# Patient Record
Sex: MALE | Race: WHITE | URBAN - METROPOLITAN AREA
[De-identification: names, ages, dates, MRNs, and addresses within clinical notes are randomized per-mention and may not be internally consistent; named-entity substitution may affect disease eponyms.]

---

## 2017-01-16 ENCOUNTER — TELEPHONE (OUTPATIENT)
Dept: INTERNAL MEDICINE | Age: 37
End: 2017-01-16

## 2017-08-09 ENCOUNTER — OFFICE VISIT (OUTPATIENT)
Dept: INTERNAL MEDICINE | Age: 37
End: 2017-08-09

## 2017-08-09 VITALS
HEIGHT: 71 IN | SYSTOLIC BLOOD PRESSURE: 100 MMHG | BODY MASS INDEX: 21.7 KG/M2 | WEIGHT: 155 LBS | HEART RATE: 70 BPM | RESPIRATION RATE: 12 BRPM | DIASTOLIC BLOOD PRESSURE: 60 MMHG

## 2017-08-09 DIAGNOSIS — Z00.00 ROUTINE GENERAL MEDICAL EXAMINATION AT A HEALTH CARE FACILITY: Primary | ICD-10-CM

## 2017-08-09 LAB
BILIRUBIN, POC: NORMAL
BLOOD URINE, POC: NORMAL
CLARITY, POC: CLEAR
COLOR, POC: YELLOW
GLUCOSE URINE, POC: NORMAL
KETONES, POC: NORMAL
LEUKOCYTE EST, POC: NORMAL
NITRITE, POC: NORMAL
PH, POC: 6
PROTEIN, POC: NORMAL
SPECIFIC GRAVITY, POC: 1.01
UROBILINOGEN, POC: NORMAL

## 2017-08-09 PROCEDURE — 99395 PREV VISIT EST AGE 18-39: CPT | Performed by: INTERNAL MEDICINE

## 2017-08-09 PROCEDURE — 81002 URINALYSIS NONAUTO W/O SCOPE: CPT | Performed by: INTERNAL MEDICINE

## 2017-08-09 ASSESSMENT — PATIENT HEALTH QUESTIONNAIRE - PHQ9
2. FEELING DOWN, DEPRESSED OR HOPELESS: 0
SUM OF ALL RESPONSES TO PHQ9 QUESTIONS 1 & 2: 0
1. LITTLE INTEREST OR PLEASURE IN DOING THINGS: 0
SUM OF ALL RESPONSES TO PHQ QUESTIONS 1-9: 0

## 2017-08-18 LAB
ALBUMIN SERPL-MCNC: 4.7 G/DL (ref 3.5–5.7)
ALP BLD-CCNC: 51 U/L (ref 36–125)
ALT SERPL-CCNC: 12 U/L (ref 7–52)
ANION GAP SERPL CALCULATED.3IONS-SCNC: 6 MMOL/L (ref 3–16)
AST SERPL-CCNC: 14 U/L (ref 13–39)
BASOPHILS ABSOLUTE: 31 /UL (ref 0–200)
BASOPHILS RELATIVE PERCENT: 0.7 % (ref 0–1)
BILIRUB SERPL-MCNC: 0.9 MG/DL (ref 0–1.5)
BUN BLDV-MCNC: 23 MG/DL (ref 7–25)
CALCIUM SERPL-MCNC: 9.7 MG/DL (ref 8.6–10.3)
CHLORIDE BLD-SCNC: 104 MMOL/L (ref 98–110)
CO2: 30 MMOL/L (ref 21–33)
CREAT SERPL-MCNC: 1.02 MG/DL (ref 0.6–1.3)
EOSINOPHILS ABSOLUTE: 136 /UL (ref 15–500)
EOSINOPHILS RELATIVE PERCENT: 3.1 % (ref 0–8)
GFR, ESTIMATED: >90 SEE NOTE.
GFR, ESTIMATED: >90 SEE NOTE.
GLUCOSE BLD-MCNC: 102 MG/DL (ref 70–100)
HCT VFR BLD CALC: 43.1 % (ref 38.5–50)
HEMOGLOBIN: 14.7 G/DL (ref 13.2–17.1)
LYMPHOCYTES ABSOLUTE: 1364 /UL (ref 850–3900)
LYMPHOCYTES RELATIVE PERCENT: 31 % (ref 15–45)
MCH RBC QN AUTO: 31.2 PG (ref 27–33)
MCHC RBC AUTO-ENTMCNC: 34.2 G/DL (ref 32–36)
MCV RBC AUTO: 91.3 FL (ref 80–100)
MONOCYTES ABSOLUTE: 378 /UL (ref 200–950)
MONOCYTES RELATIVE PERCENT: 8.6 % (ref 0–12)
NEUTROPHILS ABSOLUTE: 2490 /UL (ref 1500–7800)
NUCLEATED RED BLOOD CELLS: 0 /100 WBC (ref 0–0)
OSMOLALITY CALCULATION: 294 MOSM/KG (ref 278–305)
PDW BLD-RTO: 12.4 % (ref 11–15)
PLATELET # BLD: 154 10E3/UL (ref 140–400)
PMV BLD AUTO: 9.6 FL (ref 7.5–11.5)
POTASSIUM SERPL-SCNC: 4.6 MMOL/L (ref 3.5–5.3)
RBC # BLD: 4.72 10E6/UL (ref 4.2–5.8)
SEGMENTED NEUTROPHILS RELATIVE PERCENT: 56.6 % (ref 40–80)
SODIUM BLD-SCNC: 140 MMOL/L (ref 133–146)
TOTAL PROTEIN: 6.7 G/DL (ref 6.4–8.9)
TSH, 3RD GENERATION: 0.68 UIU/ML (ref 0.34–5.6)
WBC # BLD: 4.4 10E3/UL (ref 3.8–10.8)

## 2018-06-25 ENCOUNTER — TELEPHONE (OUTPATIENT)
Dept: INTERNAL MEDICINE | Age: 38
End: 2018-06-25

## 2018-06-26 ENCOUNTER — HOSPITAL ENCOUNTER (OUTPATIENT)
Dept: OTHER | Age: 38
Discharge: HOME | End: 2018-06-26
Attending: INTERNAL MEDICINE | Admitting: INTERNAL MEDICINE

## 2018-06-26 ENCOUNTER — OFFICE VISIT (OUTPATIENT)
Dept: INTERNAL MEDICINE | Age: 38
End: 2018-06-26

## 2018-06-26 VITALS
WEIGHT: 153 LBS | HEART RATE: 60 BPM | BODY MASS INDEX: 21.42 KG/M2 | SYSTOLIC BLOOD PRESSURE: 100 MMHG | RESPIRATION RATE: 12 BRPM | HEIGHT: 71 IN | DIASTOLIC BLOOD PRESSURE: 60 MMHG

## 2018-06-26 DIAGNOSIS — R06.02 SOB (SHORTNESS OF BREATH): ICD-10-CM

## 2018-06-26 DIAGNOSIS — R06.02 SOB (SHORTNESS OF BREATH): Primary | ICD-10-CM

## 2018-06-26 DIAGNOSIS — R07.9 CHEST PAIN, UNSPECIFIED TYPE: ICD-10-CM

## 2018-06-26 PROBLEM — J93.9 PNEUMOTHORAX: Status: ACTIVE | Noted: 2018-06-26

## 2018-06-26 PROCEDURE — 93000 ELECTROCARDIOGRAM COMPLETE: CPT | Performed by: INTERNAL MEDICINE

## 2018-06-26 PROCEDURE — 99213 OFFICE O/P EST LOW 20 MIN: CPT | Performed by: INTERNAL MEDICINE

## 2018-07-03 ENCOUNTER — HOSPITAL ENCOUNTER (OUTPATIENT)
Dept: OTHER | Age: 38
Discharge: HOME | End: 2018-07-03
Attending: INTERNAL MEDICINE | Admitting: INTERNAL MEDICINE

## 2018-07-03 ENCOUNTER — OFFICE VISIT (OUTPATIENT)
Dept: INTERNAL MEDICINE | Age: 38
End: 2018-07-03

## 2018-07-03 VITALS
WEIGHT: 150 LBS | HEART RATE: 66 BPM | HEIGHT: 72 IN | RESPIRATION RATE: 12 BRPM | DIASTOLIC BLOOD PRESSURE: 76 MMHG | BODY MASS INDEX: 20.32 KG/M2 | SYSTOLIC BLOOD PRESSURE: 122 MMHG

## 2018-07-03 DIAGNOSIS — J93.11 PRIMARY SPONTANEOUS PNEUMOTHORAX: Primary | ICD-10-CM

## 2018-07-03 DIAGNOSIS — J93.11 PRIMARY SPONTANEOUS PNEUMOTHORAX: ICD-10-CM

## 2018-07-03 PROCEDURE — 99213 OFFICE O/P EST LOW 20 MIN: CPT | Performed by: INTERNAL MEDICINE

## 2018-07-03 NOTE — PROGRESS NOTES
Chief Complaint   Patient presents with    Chest Injury        HPI:  Comes in for follow up of a spontaneous pneumothorax treated with a chest tube. He has no chest pain or shortness of breath. He has an appointment with Dr. Vigil Shown the end of next week. Social History     Social History    Marital status:      Spouse name: N/A    Number of children: N/A    Years of education: N/A     Occupational History    teacher      Social History Main Topics    Smoking status: Never Smoker    Smokeless tobacco: Never Used    Alcohol use Yes      Comment: occasional    Drug use: Unknown    Sexual activity: Not on file     Other Topics Concern    Not on file     Social History Narrative    Living Will:  No.             Patient Active Problem List   Diagnosis    Pneumothorax    Spontaneous pneumothorax     Past Medical History:   Diagnosis Date    Spontaneous pneumothorax *June, 2018    Dr. Musa Rothman     History reviewed. No pertinent surgical history. No current outpatient prescriptions on file. No current facility-administered medications for this visit.       No Known Allergies    Physical Exam:   /76 (Site: Left Arm, Position: Sitting, Cuff Size: Medium Adult)   Pulse 66   Resp 12   Ht 6' (1.829 m)   Wt 150 lb (68 kg)   BMI 20.34 kg/m²   General appearance: alert, appears stated age and cooperative  Lungs: clear to auscultation bilaterally  Heart: regular rate and rhythm, S1, S2 normal, no murmur, click, rub or gallop  Extremities: extremities normal, atraumatic, no cyanosis or edema  Other: N/A    Lab Review: not applicable      Assessment: Pneumothorax - resolved    Plan:               Chest xrsouth Alcantar MD

## 2018-07-13 ENCOUNTER — OFFICE VISIT (OUTPATIENT)
Dept: CARDIOTHORACIC SURGERY | Age: 38
End: 2018-07-13

## 2018-07-13 VITALS
HEIGHT: 72 IN | DIASTOLIC BLOOD PRESSURE: 72 MMHG | TEMPERATURE: 98.5 F | BODY MASS INDEX: 20.94 KG/M2 | OXYGEN SATURATION: 99 % | SYSTOLIC BLOOD PRESSURE: 110 MMHG | HEART RATE: 47 BPM | WEIGHT: 154.6 LBS

## 2018-07-13 DIAGNOSIS — S27.0XXD TRAUMATIC PNEUMOTHORAX, SUBSEQUENT ENCOUNTER: Primary | ICD-10-CM

## 2018-07-13 PROCEDURE — 99213 OFFICE O/P EST LOW 20 MIN: CPT | Performed by: THORACIC SURGERY (CARDIOTHORACIC VASCULAR SURGERY)

## 2018-07-13 NOTE — LETTER
07/13/18        [facility]  [address] [address]        RE:    Shirin Thomason,   1980    Dear Dr. Radha Page,    It was my pleasure to see your patient, Shirin Thomason, in the office today in follow up of spontaneous ptx requiring tube thoracosotomy 6/26/18 at [facility].    I have attached a copy of the office evaluation. Thank you for allowing me to participate in the care of this patient.       Sincerely,     Racheal Barnes MD    CC: Orval Patient, APRN - CNP  [id number] [facility] [address]  [address] [place] [phone]  VIA In [facility]

## 2018-07-13 NOTE — PROGRESS NOTES
Progress Note    S/P L CT placement for spontaneous ptx 6/26/18. CT site is healing without redness or purulence. Denies any shortness of breath or pain. Had f/u CXR 7/3/18. Vital Signs:                                                 /72 (Site: Left Arm, Position: Sitting)   Pulse (!) 47   Temp 98.5 °F (36.9 °C) (Oral)   Ht 6' (1.829 m)   Wt 154 lb 9.6 oz (70.1 kg)   SpO2 99%   BMI 20.97 kg/m²      Preop weight: 153 lb    CV: reg  Pulm: clear, L insertion site c/d/i      Medications:  Prior to Admission medications    Not on File        Data Review:  CBC:   Lab Results   Component Value Date    WBC 4.4 08/18/2017    HGB 14.7 08/18/2017    HCT 43.1 08/18/2017    MCV 91.3 08/18/2017     08/18/2017     BMP:   Lab Results   Component Value Date     08/18/2017    K 4.6 08/18/2017     08/18/2017    CO2 30 08/18/2017    BUN 23 08/18/2017    CREATININE 1.02 08/18/2017    CALCIUM 9.7 08/18/2017     PT/INR: No results found for: PROTIME, INR      Assessment/Plan:  discussed spont ptx. Tall aesthenic build. Risk of recurrence with potential need for surgical intervention - of note pt had similar sx 5 years ago, no imaging. 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 Has never smoked.   Follow up as needed    Jared Suresh MD  7/13/2018  9:25 AM

## 2019-04-29 PROBLEM — J93.9 PNEUMOTHORAX: Status: RESOLVED | Noted: 2018-06-26 | Resolved: 2019-04-29

## 2019-05-09 ENCOUNTER — OFFICE VISIT (OUTPATIENT)
Dept: INTERNAL MEDICINE CLINIC | Age: 39
End: 2019-05-09
Payer: COMMERCIAL

## 2019-05-09 VITALS
DIASTOLIC BLOOD PRESSURE: 70 MMHG | RESPIRATION RATE: 12 BRPM | WEIGHT: 157 LBS | SYSTOLIC BLOOD PRESSURE: 110 MMHG | HEART RATE: 66 BPM | BODY MASS INDEX: 21.26 KG/M2 | HEIGHT: 72 IN

## 2019-05-09 DIAGNOSIS — Z12.11 SPECIAL SCREENING FOR MALIGNANT NEOPLASMS, COLON: ICD-10-CM

## 2019-05-09 DIAGNOSIS — Z00.00 ROUTINE GENERAL MEDICAL EXAMINATION AT A HEALTH CARE FACILITY: Primary | ICD-10-CM

## 2019-05-09 LAB
BILIRUBIN, POC: NORMAL
BLOOD URINE, POC: NORMAL
CLARITY, POC: CLEAR
COLOR, POC: YELLOW
GLUCOSE URINE, POC: NORMAL
KETONES, POC: NORMAL
LEUKOCYTE EST, POC: NORMAL
NITRITE, POC: NORMAL
PH, POC: 5
PROTEIN, POC: NORMAL
SPECIFIC GRAVITY, POC: 1
UROBILINOGEN, POC: NORMAL

## 2019-05-09 PROCEDURE — 81002 URINALYSIS NONAUTO W/O SCOPE: CPT | Performed by: INTERNAL MEDICINE

## 2019-05-09 PROCEDURE — 99395 PREV VISIT EST AGE 18-39: CPT | Performed by: INTERNAL MEDICINE

## 2019-05-09 ASSESSMENT — PATIENT HEALTH QUESTIONNAIRE - PHQ9
SUM OF ALL RESPONSES TO PHQ QUESTIONS 1-9: 0
SUM OF ALL RESPONSES TO PHQ QUESTIONS 1-9: 0
2. FEELING DOWN, DEPRESSED OR HOPELESS: 0
1. LITTLE INTEREST OR PLEASURE IN DOING THINGS: 0
SUM OF ALL RESPONSES TO PHQ9 QUESTIONS 1 & 2: 0

## 2019-05-09 NOTE — PROGRESS NOTES
Willa Winters 41 y.o. presents today with   Chief Complaint   Patient presents with    Annual Exam       Family History   Problem Relation Age of Onset    Other Father 62        Alive, in good health.  Other Mother 62        Alive, in good health.        Social History     Socioeconomic History    Marital status:      Spouse name: Not on file    Number of children: Not on file    Years of education: Not on file    Highest education level: Not on file   Occupational History    Occupation: teacher   Social Needs    Financial resource strain: Not on file    Food insecurity:     Worry: Not on file     Inability: Not on file    Transportation needs:     Medical: Not on file     Non-medical: Not on file   Tobacco Use    Smoking status: Never Smoker    Smokeless tobacco: Never Used   Substance and Sexual Activity    Alcohol use: Yes     Comment: occasional    Drug use: Not on file    Sexual activity: Not on file   Lifestyle    Physical activity:     Days per week: Not on file     Minutes per session: Not on file    Stress: Not on file   Relationships    Social connections:     Talks on phone: Not on file     Gets together: Not on file     Attends Sikh service: Not on file     Active member of club or organization: Not on file     Attends meetings of clubs or organizations: Not on file     Relationship status: Not on file    Intimate partner violence:     Fear of current or ex partner: Not on file     Emotionally abused: Not on file     Physically abused: Not on file     Forced sexual activity: Not on file   Other Topics Concern    Not on file   Social History Narrative    Living Will:  No.               Patient Active Problem List   Diagnosis   (none) - all problems resolved or deleted       Past Medical History:   Diagnosis Date    Spontaneous pneumothorax *June, 2018    Dr. Jacques Ivan        Past Surgical History:   Procedure Laterality Date    CHEST TUBE INSERTION Left 06/26/2018 for spontaneous pneumothorax       No current outpatient medications on file. No current facility-administered medications for this visit. No Known Allergies    Review of Systems:     Immunization History   Administered Date(s) Administered    Tdap (Boostrix, Adacel) 07/07/2014     Eye Exam: 2017 by Dr. Gabriel Malone   Chest: Denies: cough, hemoptysis, shortness of breath, pleuritic chest pain, wheezing  Cardiovascular: Denies: chest pain, dyspnea on exertion, orthopnea, paroxysmal nocturnal dyspnea, edema, palpitations  Abdomen: no abdominal pain, change in bowel habits, or black or bloody stools  Colonoscopy: N/A  Fall Risk low  ADL   Without assistance  Other: N/A    Physical Exam:  General appearance: alert, appears stated age and cooperative  /70 (Site: Left Upper Arm, Position: Sitting, Cuff Size: Medium Adult)   Pulse 66   Resp 12   Ht 6' (1.829 m)   Wt 157 lb (71.2 kg)   BMI 21.29 kg/m²   Eyes: conjunctivae/corneas clear. PERRL, EOM's intact. Fundi benign. Ears: normal TM's and external ear canals both ears  Nose: Nares normal. Septum midline. Mucosa normal. No drainage or sinus tenderness. Throat: no abnormalities  Neck: no adenopathy, no carotid bruit, no JVD, supple, symmetrical, trachea midline and thyroid not enlarged, symmetric, no tenderness/mass/nodules  Nodes:no adenopathy palpable  Lungs: clear to auscultation bilaterally  Heart: regular rate and rhythm, S1, S2 normal, no murmur, click, rub or gallop  Abdomen: soft, non-tender; bowel sounds normal; no masses,  no organomegaly  Testicular Mass: No  Extremities: extremities normal, atraumatic, no cyanosis or edema  Neurological: Gait normal. Reflexes normal and symmetric.  Sensation grossly normal  Pulse: radial=4/4, femoral=4/4, popliteal=4/4, dorsalis pedis=4/4,   Rectal:N/A  Prostate:Deferred       Skin: normal coloration and turgor, no rashes, no suspicious skin lesions noted  Psych: normal    Lab Review:not applicable    Assessment: Stable    Plan:              Fasting labs, UA, and hemoccults     HIREN Jiménez MD

## 2019-05-15 DIAGNOSIS — Z00.00 ROUTINE GENERAL MEDICAL EXAMINATION AT A HEALTH CARE FACILITY: ICD-10-CM

## 2019-05-15 DIAGNOSIS — Z12.11 SPECIAL SCREENING FOR MALIGNANT NEOPLASMS, COLON: ICD-10-CM

## 2019-05-15 LAB
CONTROL: NORMAL
HEMOCCULT STL QL: NORMAL

## 2019-05-15 PROCEDURE — 82274 ASSAY TEST FOR BLOOD FECAL: CPT | Performed by: INTERNAL MEDICINE

## 2019-06-10 LAB
ALBUMIN SERPL-MCNC: 4.3 G/DL (ref 3.5–5.7)
ALP BLD-CCNC: 53 U/L (ref 36–125)
ALT SERPL-CCNC: 10 U/L (ref 7–52)
ANION GAP SERPL CALCULATED.3IONS-SCNC: 7 MMOL/L (ref 3–16)
AST SERPL-CCNC: 14 U/L (ref 13–39)
BILIRUB SERPL-MCNC: 0.7 MG/DL (ref 0–1.5)
BUN BLDV-MCNC: 19 MG/DL (ref 7–25)
CALCIUM SERPL-MCNC: 9.4 MG/DL (ref 8.6–10.3)
CHLORIDE BLD-SCNC: 104 MMOL/L (ref 98–110)
CHOLESTEROL, TOTAL: 158 MG/DL (ref 0–200)
CO2: 30 MMOL/L (ref 21–33)
CREAT SERPL-MCNC: 0.85 MG/DL (ref 0.6–1.3)
GFR, ESTIMATED: >90 SEE NOTE.
GFR, ESTIMATED: >90 SEE NOTE.
GLUCOSE BLD-MCNC: 96 MG/DL (ref 70–100)
HDLC SERPL-MCNC: 50 MG/DL (ref 60–92)
LDL CHOLESTEROL CALCULATED: 95 MG/DL
OSMOLALITY CALCULATION: 294 MOSM/KG (ref 278–305)
POTASSIUM SERPL-SCNC: 4.3 MMOL/L (ref 3.5–5.3)
SODIUM BLD-SCNC: 141 MMOL/L (ref 133–146)
TOTAL PROTEIN: 6.2 G/DL (ref 6.4–8.9)
TRIGL SERPL-MCNC: 64 MG/DL (ref 10–149)

## 2019-06-11 LAB
BASOPHILS ABSOLUTE: 51 /UL (ref 0–200)
BASOPHILS RELATIVE PERCENT: 1.3 % (ref 0–1)
EOSINOPHILS ABSOLUTE: 168 /UL (ref 15–500)
EOSINOPHILS RELATIVE PERCENT: 4.3 % (ref 0–8)
HCT VFR BLD CALC: 41.5 % (ref 38.5–50)
HEMOGLOBIN: 14 G/DL (ref 13.2–17.1)
LYMPHOCYTES ABSOLUTE: 1373 /UL (ref 850–3900)
LYMPHOCYTES RELATIVE PERCENT: 35.2 % (ref 15–45)
MCH RBC QN AUTO: 30.9 PG (ref 27–33)
MCHC RBC AUTO-ENTMCNC: 33.8 G/DL (ref 32–36)
MCV RBC AUTO: 91.4 FL (ref 80–100)
MONOCYTES ABSOLUTE: 289 /UL (ref 200–950)
MONOCYTES RELATIVE PERCENT: 7.4 % (ref 0–12)
NEUTROPHILS ABSOLUTE: 2020 /UL (ref 1500–7800)
NUCLEATED RED BLOOD CELLS: 0 /100 WBC (ref 0–0)
PDW BLD-RTO: 12.8 % (ref 11–15)
PLATELET # BLD: 149 10E3/UL (ref 140–400)
PMV BLD AUTO: 9 FL (ref 7.5–11.5)
RBC # BLD: 4.54 10E6/UL (ref 4.2–5.8)
SEGMENTED NEUTROPHILS RELATIVE PERCENT: 51.8 % (ref 40–80)
WBC # BLD: 3.9 10E3/UL (ref 3.8–10.8)